# Patient Record
Sex: MALE | Race: WHITE | NOT HISPANIC OR LATINO | URBAN - METROPOLITAN AREA
[De-identification: names, ages, dates, MRNs, and addresses within clinical notes are randomized per-mention and may not be internally consistent; named-entity substitution may affect disease eponyms.]

---

## 2017-04-14 ENCOUNTER — IMPORTED ENCOUNTER (OUTPATIENT)
Dept: URBAN - METROPOLITAN AREA CLINIC 9 | Facility: CLINIC | Age: 67
End: 2017-04-14

## 2017-08-09 NOTE — PATIENT DISCUSSION
Based on today’s exam, diagnostic studies, and review of records, and the patients functional difficulty which appear to be a result of the macular pucker, the DETERMINATION WAS MADE FOR A VITRECTOMY with membrane peel. Discussed benefits, alternatives, and risks of surgery including (but not limited to) cataract (if natural lens is still present), infection, bleeding, retinal detachment, optic neuropathy, loss of vision, blindness, and loss of eye. Patient was told the vision may not return to the same level as prior to development of the membrane but should improve as the anatomy returns to a more normal contour. No prediction of the level of visual improvement and/or the degree of distortion reduction can be given.

## 2018-05-09 ENCOUNTER — IMPORTED ENCOUNTER (OUTPATIENT)
Dept: URBAN - METROPOLITAN AREA CLINIC 9 | Facility: CLINIC | Age: 68
End: 2018-05-09

## 2020-12-03 ENCOUNTER — IMPORTED ENCOUNTER (OUTPATIENT)
Dept: URBAN - METROPOLITAN AREA CLINIC 9 | Facility: CLINIC | Age: 70
End: 2020-12-03

## 2020-12-03 PROBLEM — R73.09: Noted: 2020-01-01

## 2020-12-03 PROBLEM — H47.291: Noted: 2020-12-03

## 2020-12-03 PROBLEM — H25.11: Noted: 2020-12-03

## 2020-12-03 PROBLEM — H52.223: Noted: 2020-12-03

## 2020-12-03 PROBLEM — H25.13: Noted: 2020-12-03

## 2021-10-16 ASSESSMENT — VISUAL ACUITY
OS_SC: 20/20 SN
OS_CC: 20/25 +2 SN
OD_CC: 20/200 SN
OS_SC: 20/40 -2 SN
OD_SC: CF 2FT SN
OS_CC: 20/20 SN
OS_CC: 20/30 SN
OD_CC: CF 2FT SN
OD_SC: CF 2FT SN
OD_CC: 20/200 - SN

## 2021-10-16 ASSESSMENT — TONOMETRY
OS_IOP_MMHG: 16
OD_IOP_MMHG: 16
OD_IOP_MMHG: 17
OD_IOP_MMHG: 16
OS_IOP_MMHG: 16
OS_IOP_MMHG: 12

## 2021-10-16 ASSESSMENT — KERATOMETRY
OD_K2POWER_DIOPTERS: 45.5
OD_K1POWER_DIOPTERS: 44.5
OS_K2POWER_DIOPTERS: 46.25
OD_AXISANGLE2_DEGREES: 10
OS_K1POWER_DIOPTERS: 45.25
OD_K2POWER_DIOPTERS: 45.25
OS_AXISANGLE2_DEGREES: 84
OD_AXISANGLE_DEGREES: 110
OD_K1POWER_DIOPTERS: 44.25
OD_AXISANGLE_DEGREES: 100
OD_AXISANGLE2_DEGREES: 20
OS_K2POWER_DIOPTERS: 46
OS_AXISANGLE_DEGREES: 83
OS_K1POWER_DIOPTERS: 45.5
OS_AXISANGLE2_DEGREES: 173
OS_AXISANGLE_DEGREES: 174

## 2021-12-13 ENCOUNTER — ESTABLISHED PATIENT (OUTPATIENT)
Dept: URBAN - METROPOLITAN AREA CLINIC 4 | Facility: CLINIC | Age: 71
End: 2021-12-13

## 2021-12-13 DIAGNOSIS — H40.012: ICD-10-CM

## 2021-12-13 DIAGNOSIS — H25.13: ICD-10-CM

## 2021-12-13 DIAGNOSIS — H04.123: ICD-10-CM

## 2021-12-13 DIAGNOSIS — H47.291: ICD-10-CM

## 2021-12-13 PROCEDURE — 92133 CPTRZD OPH DX IMG PST SGM ON: CPT

## 2021-12-13 PROCEDURE — 92015 DETERMINE REFRACTIVE STATE: CPT

## 2021-12-13 PROCEDURE — 92014 COMPRE OPH EXAM EST PT 1/>: CPT

## 2021-12-13 ASSESSMENT — TONOMETRY
OD_IOP_MMHG: 18
OS_IOP_MMHG: 15

## 2021-12-13 ASSESSMENT — VISUAL ACUITY
OD_SC: 20/400
OS_SC: 20/70
OU_SC: 20/70+1

## 2021-12-14 ASSESSMENT — KERATOMETRY
OS_K2POWER_DIOPTERS: 7.30
OD_AXISANGLE_DEGREES: 97
OS_AXISANGLE2_DEGREES: 175
OS_K1POWER_DIOPTERS: 7.50
OS_AXISANGLE_DEGREES: 85
OD_K2POWER_DIOPTERS: 7.49
OD_K1POWER_DIOPTERS: 7.65
OD_AXISANGLE2_DEGREES: 7

## 2022-06-21 ENCOUNTER — ESTABLISHED PATIENT (OUTPATIENT)
Dept: URBAN - METROPOLITAN AREA CLINIC 9 | Facility: CLINIC | Age: 72
End: 2022-06-21

## 2022-06-21 DIAGNOSIS — H04.123: ICD-10-CM

## 2022-06-21 DIAGNOSIS — H47.291: ICD-10-CM

## 2022-06-21 DIAGNOSIS — H25.13: ICD-10-CM

## 2022-06-21 DIAGNOSIS — H40.012: ICD-10-CM

## 2022-06-21 DIAGNOSIS — H52.223: ICD-10-CM

## 2022-06-21 DIAGNOSIS — R73.09: ICD-10-CM

## 2022-06-21 PROCEDURE — 92134 CPTRZ OPH DX IMG PST SGM RTA: CPT

## 2022-06-21 PROCEDURE — 92133 CPTRZD OPH DX IMG PST SGM ON: CPT

## 2022-06-21 PROCEDURE — 92015 DETERMINE REFRACTIVE STATE: CPT

## 2022-06-21 PROCEDURE — 92014 COMPRE OPH EXAM EST PT 1/>: CPT

## 2022-06-21 PROCEDURE — 92136 OPHTHALMIC BIOMETRY: CPT

## 2022-06-21 RX ORDER — ROSUVASTATIN CALCIUM 10 MG/1
TABLET, COATED ORAL
COMMUNITY

## 2022-06-21 RX ORDER — TAMSULOSIN HYDROCHLORIDE 0.4 MG/1
1 CAPSULE ORAL
COMMUNITY
End: 2023-02-23

## 2022-06-21 ASSESSMENT — VISUAL ACUITY
OS_SC: 20/60+2
OS_GLARE: 20/100-1
OD_SC: 20/400+1
OU_SC: 20/50+1
OD_GLARE: 20/400

## 2022-06-21 ASSESSMENT — TONOMETRY
OD_IOP_MMHG: 17
OS_IOP_MMHG: 16

## 2022-06-29 NOTE — PATIENT DISCUSSION
Dr Severino Caro Center  [address]  [place] [id number]   P: [phone]     Today's exam, diagnostic studies, and/or review of records show NO SIGNIFICANT CHANGE from previous exams.

## 2022-07-05 ENCOUNTER — PRE-OP/H&P (OUTPATIENT)
Dept: URBAN - METROPOLITAN AREA CLINIC 9 | Facility: CLINIC | Age: 72
End: 2022-07-05

## 2022-07-05 DIAGNOSIS — H25.13: ICD-10-CM

## 2022-07-05 PROCEDURE — 99211PRE PRE OP VISIT

## 2022-07-05 PROCEDURE — 92136 OPHTHALMIC BIOMETRY: CPT

## 2022-07-05 ASSESSMENT — KERATOMETRY
OS_AXISANGLE2_DEGREES: 172
OD_K2POWER_DIOPTERS: 45.25
OD_AXISANGLE2_DEGREES: 9
OS_K1POWER_DIOPTERS: 45.25
OD_AXISANGLE_DEGREES: 99
OD_K1POWER_DIOPTERS: 44.25
OS_K2POWER_DIOPTERS: 46.25
OS_AXISANGLE_DEGREES: 82

## 2022-07-05 ASSESSMENT — VISUAL ACUITY
OU_SC: J5+2
OS_SC: J20
OD_SC: J5

## 2022-08-09 PROBLEM — R73.09: Noted: 2020-01-01

## 2022-08-09 PROBLEM — Z96.1: Noted: 2022-08-09

## 2022-08-09 PROBLEM — H25.11: Noted: 2020-12-03

## 2022-08-09 PROBLEM — H52.221: Noted: 2020-12-03

## 2022-08-09 PROBLEM — Z98.42: Noted: 2022-08-09

## 2022-08-09 PROBLEM — H47.291: Noted: 2020-12-03

## 2022-08-09 ASSESSMENT — KERATOMETRY
OS_K1POWER_DIOPTERS: 45.25
OD_K2POWER_DIOPTERS: 45.25
OD_K1POWER_DIOPTERS: 44.25
OD_AXISANGLE2_DEGREES: 9
OD_AXISANGLE_DEGREES: 99
OS_K2POWER_DIOPTERS: 46.25
OS_AXISANGLE2_DEGREES: 172
OS_AXISANGLE_DEGREES: 82

## 2022-08-10 ENCOUNTER — POST-OP (OUTPATIENT)
Dept: URBAN - METROPOLITAN AREA CLINIC 9 | Facility: CLINIC | Age: 72
End: 2022-08-10

## 2022-08-10 DIAGNOSIS — Z96.1: ICD-10-CM

## 2022-08-10 PROCEDURE — 99024 POSTOP FOLLOW-UP VISIT: CPT

## 2022-08-10 ASSESSMENT — VISUAL ACUITY
OS_SC: 20/25
OU_SC: 20/25

## 2022-08-10 ASSESSMENT — TONOMETRY: OS_IOP_MMHG: 23

## 2022-08-15 ENCOUNTER — POST-OP (OUTPATIENT)
Dept: URBAN - METROPOLITAN AREA CLINIC 9 | Facility: CLINIC | Age: 72
End: 2022-08-15

## 2022-08-15 DIAGNOSIS — Z96.1: ICD-10-CM

## 2022-08-15 DIAGNOSIS — H25.11: ICD-10-CM

## 2022-08-15 PROCEDURE — 99024 POSTOP FOLLOW-UP VISIT: CPT

## 2022-08-15 PROCEDURE — 92136 OPHTHALMIC BIOMETRY: CPT

## 2022-08-15 ASSESSMENT — KERATOMETRY
OS_K2POWER_DIOPTERS: 46.25
OS_AXISANGLE2_DEGREES: 161
OS_AXISANGLE_DEGREES: 71
OS_K1POWER_DIOPTERS: 45.00

## 2022-08-15 ASSESSMENT — TONOMETRY: OS_IOP_MMHG: 15

## 2022-08-15 ASSESSMENT — VISUAL ACUITY
OS_SC: 20/25-1
OU_SC: 20/25-1

## 2023-07-14 NOTE — PATIENT DISCUSSION
12/12/18 NO RECURRENCE, NO METAMORPHOPSIA.
6/13/18, CENTRALLY IMPROVED, SLIGHT INCREASE JUXTAFOVEAL AREA.
ARTIFICIAL TEARS to affected eye(s) as needed.
BMI Within normal limits, continue current management.
Continue to OBSERVE and MONITOR for progression.
Does NOT APPEAR VISUALLY SIGNIFICANT.
ERM does NOT APPEAR VISUALLY SIGNIFICANT.
MONITOR response to TREATMENT.
My findings and recommendations are based on patient's symptoms, eye exam, diagnostic testing, and records.
No RECURRENT ERM.
No retinal tears or retinal detachment seen on clinical exam today. Reviewed the signs and symptoms of retinal tear/retinal detachment and the importance of calling for prompt evaluation should there be increasing floaters, new flashing lights, or decreasing peripheral vision in either eye at any time. Observation recommended.
RECURRENT, FIRST SURGERY DR Khari Farrell 2007.
Recommend MONITORING for RECURRENCE of ERM.
Recommend OBSERVATION and continued MONITORING for progression.
Recommended OBSERVATION and MONITORING for change.
Recommended OBSERVATION and continued MONITORING for progression.
Today's exam, diagnostic studies, and/or review of records show NO SIGNIFICANT CHANGE from previous exams.
Well positioned.
Detail Level: Generalized

## 2024-08-13 ENCOUNTER — APPOINTMENT (RX ONLY)
Dept: URBAN - METROPOLITAN AREA CLINIC 20 | Facility: CLINIC | Age: 74
Setting detail: DERMATOLOGY
End: 2024-08-13

## 2024-08-13 DIAGNOSIS — Z71.89 OTHER SPECIFIED COUNSELING: ICD-10-CM

## 2024-08-13 DIAGNOSIS — L71.8 OTHER ROSACEA: ICD-10-CM | Status: INADEQUATELY CONTROLLED

## 2024-08-13 DIAGNOSIS — L82.1 OTHER SEBORRHEIC KERATOSIS: ICD-10-CM

## 2024-08-13 DIAGNOSIS — D18.0 HEMANGIOMA: ICD-10-CM

## 2024-08-13 DIAGNOSIS — L81.4 OTHER MELANIN HYPERPIGMENTATION: ICD-10-CM

## 2024-08-13 DIAGNOSIS — D22 MELANOCYTIC NEVI: ICD-10-CM

## 2024-08-13 DIAGNOSIS — L57.8 OTHER SKIN CHANGES DUE TO CHRONIC EXPOSURE TO NONIONIZING RADIATION: ICD-10-CM

## 2024-08-13 DIAGNOSIS — Z87.2 PERSONAL HISTORY OF DISEASES OF THE SKIN AND SUBCUTANEOUS TISSUE: ICD-10-CM

## 2024-08-13 PROBLEM — D18.01 HEMANGIOMA OF SKIN AND SUBCUTANEOUS TISSUE: Status: ACTIVE | Noted: 2024-08-13

## 2024-08-13 PROBLEM — D22.9 MELANOCYTIC NEVI, UNSPECIFIED: Status: ACTIVE | Noted: 2024-08-13

## 2024-08-13 PROCEDURE — ? PRESCRIPTION

## 2024-08-13 PROCEDURE — ? COUNSELING

## 2024-08-13 PROCEDURE — 99214 OFFICE O/P EST MOD 30 MIN: CPT

## 2024-08-13 PROCEDURE — ? PRESCRIPTION MEDICATION MANAGEMENT

## 2024-08-13 PROCEDURE — ? LIQUID NITROGEN (COSMETIC)

## 2024-08-13 RX ORDER — HYDROCORTISONE 25 MG/G
CREAM TOPICAL
Qty: 20 | Refills: 0 | Status: ERX | COMMUNITY
Start: 2024-08-13

## 2024-08-13 RX ORDER — METRONIDAZOLE 10 MG/G
GEL TOPICAL
Qty: 60 | Refills: 0 | Status: ERX | COMMUNITY
Start: 2024-08-13

## 2024-08-13 RX ADMIN — HYDROCORTISONE: 25 CREAM TOPICAL at 00:00

## 2024-08-13 RX ADMIN — METRONIDAZOLE: 10 GEL TOPICAL at 00:00

## 2024-08-13 ASSESSMENT — LOCATION SIMPLE DESCRIPTION DERM
LOCATION SIMPLE: RIGHT CHEEK
LOCATION SIMPLE: LEFT FOREARM
LOCATION SIMPLE: LOWER BACK
LOCATION SIMPLE: LEFT UPPER ARM
LOCATION SIMPLE: RIGHT POSTERIOR THIGH
LOCATION SIMPLE: LEFT POSTERIOR THIGH
LOCATION SIMPLE: RIGHT TEMPLE
LOCATION SIMPLE: LEFT CHEEK
LOCATION SIMPLE: RIGHT THIGH
LOCATION SIMPLE: RIGHT UPPER BACK
LOCATION SIMPLE: RIGHT UPPER ARM
LOCATION SIMPLE: CHEST
LOCATION SIMPLE: LEFT FOREHEAD
LOCATION SIMPLE: LEFT SCALP
LOCATION SIMPLE: NOSE
LOCATION SIMPLE: INFERIOR FOREHEAD
LOCATION SIMPLE: ABDOMEN
LOCATION SIMPLE: UPPER BACK
LOCATION SIMPLE: LEFT THIGH
LOCATION SIMPLE: RIGHT FOREARM
LOCATION SIMPLE: LEFT TEMPLE

## 2024-08-13 ASSESSMENT — LOCATION ZONE DERM
LOCATION ZONE: TRUNK
LOCATION ZONE: FACE
LOCATION ZONE: SCALP
LOCATION ZONE: ARM
LOCATION ZONE: LEG
LOCATION ZONE: NOSE

## 2024-08-13 ASSESSMENT — LOCATION DETAILED DESCRIPTION DERM
LOCATION DETAILED: LEFT DISTAL POSTERIOR THIGH
LOCATION DETAILED: LEFT CENTRAL MALAR CHEEK
LOCATION DETAILED: INFERIOR MID FOREHEAD
LOCATION DETAILED: LEFT INFERIOR TEMPLE
LOCATION DETAILED: EPIGASTRIC SKIN
LOCATION DETAILED: MIDDLE STERNUM
LOCATION DETAILED: LEFT VENTRAL DISTAL FOREARM
LOCATION DETAILED: LEFT ANTERIOR PROXIMAL UPPER ARM
LOCATION DETAILED: RIGHT VENTRAL PROXIMAL FOREARM
LOCATION DETAILED: RIGHT ANTERIOR DISTAL THIGH
LOCATION DETAILED: LEFT SUPERIOR FOREHEAD
LOCATION DETAILED: RIGHT PROXIMAL DORSAL FOREARM
LOCATION DETAILED: RIGHT DISTAL POSTERIOR THIGH
LOCATION DETAILED: RIGHT CENTRAL MALAR CHEEK
LOCATION DETAILED: RIGHT SUPERIOR UPPER BACK
LOCATION DETAILED: LEFT CENTRAL FRONTAL SCALP
LOCATION DETAILED: RIGHT LATERAL SUPERIOR CHEST
LOCATION DETAILED: RIGHT MEDIAL UPPER BACK
LOCATION DETAILED: LEFT MID TEMPLE
LOCATION DETAILED: LEFT SUPERIOR CENTRAL MALAR CHEEK
LOCATION DETAILED: INFERIOR LUMBAR SPINE
LOCATION DETAILED: RIGHT ANTERIOR PROXIMAL UPPER ARM
LOCATION DETAILED: LEFT LATERAL ABDOMEN
LOCATION DETAILED: LEFT ANTERIOR PROXIMAL THIGH
LOCATION DETAILED: RIGHT LATERAL MALAR CHEEK
LOCATION DETAILED: RIGHT INFERIOR TEMPLE
LOCATION DETAILED: INFERIOR THORACIC SPINE
LOCATION DETAILED: NASAL DORSUM

## 2024-08-13 NOTE — PROCEDURE: PRESCRIPTION MEDICATION MANAGEMENT
Detail Level: Zone
Initiate Treatment: Hydrocortisone 2.5% cream
Render In Strict Bullet Format?: No
Initiate Treatment: Metornidazole 1% gel

## 2024-08-13 NOTE — PROCEDURE: LIQUID NITROGEN (COSMETIC)
Price (Use Numbers Only, No Special Characters Or $): 150
Render Post-Care Instructions In Note?: no
Detail Level: Detailed
Billing Information: Bill by Static Price
Show Spray Paint Technique Variable?: Yes
Spray Paint Text: The liquid nitrogen was applied to the skin utilizing a spray paint frosting technique.
Post-Care Instructions: I reviewed with the patient in detail post-care instructions. Patient is to wear sunprotection, and avoid picking at any of the treated lesions. Pt may apply Vaseline to crusted or scabbing areas.
Consent: The patient's consent was obtained including but not limited to risks of crusting, scabbing, blistering, scarring, darker or lighter pigmentary change, recurrence, incomplete removal and infection. The patient understands that the procedure is cosmetic in nature and is not covered by insurance.